# Patient Record
Sex: MALE | Race: BLACK OR AFRICAN AMERICAN
[De-identification: names, ages, dates, MRNs, and addresses within clinical notes are randomized per-mention and may not be internally consistent; named-entity substitution may affect disease eponyms.]

---

## 2024-05-07 ENCOUNTER — DASHBOARD ENCOUNTERS (OUTPATIENT)
Age: 54
End: 2024-05-07

## 2024-05-07 ENCOUNTER — LAB OUTSIDE AN ENCOUNTER (OUTPATIENT)
Dept: URBAN - METROPOLITAN AREA CLINIC 74 | Facility: CLINIC | Age: 54
End: 2024-05-07

## 2024-05-07 ENCOUNTER — OFFICE VISIT (OUTPATIENT)
Dept: URBAN - METROPOLITAN AREA CLINIC 74 | Facility: CLINIC | Age: 54
End: 2024-05-07
Payer: COMMERCIAL

## 2024-05-07 VITALS
OXYGEN SATURATION: 97 % | HEART RATE: 92 BPM | DIASTOLIC BLOOD PRESSURE: 90 MMHG | HEIGHT: 73 IN | BODY MASS INDEX: 33.53 KG/M2 | SYSTOLIC BLOOD PRESSURE: 154 MMHG | WEIGHT: 253 LBS | TEMPERATURE: 97.9 F

## 2024-05-07 DIAGNOSIS — R19.7 DIARRHEA: ICD-10-CM

## 2024-05-07 DIAGNOSIS — Z12.11 COLON CANCER SCREENING: ICD-10-CM

## 2024-05-07 DIAGNOSIS — K21.9 GERD: ICD-10-CM

## 2024-05-07 PROCEDURE — 99204 OFFICE O/P NEW MOD 45 MIN: CPT | Performed by: INTERNAL MEDICINE

## 2024-05-07 RX ORDER — FLUTICASONE PROPIONATE 50 UG/1
1 SPRAY IN EACH NOSTRIL SPRAY, METERED NASAL ONCE A DAY
Status: ACTIVE | COMMUNITY

## 2024-05-07 RX ORDER — MELOXICAM 15 MG/1
1 TABLET TABLET ORAL ONCE A DAY
Status: ACTIVE | COMMUNITY

## 2024-05-07 RX ORDER — PANTOPRAZOLE SODIUM 40 MG/1
1 TABLET TABLET, DELAYED RELEASE ORAL ONCE A DAY
Qty: 90 | Refills: 0 | OUTPATIENT
Start: 2024-05-07

## 2024-05-07 RX ORDER — POLYETHYLENE GLYCOL 3350, SODIUM SULFATE, SODIUM CHLORIDE, POTASSIUM CHLORIDE, ASCORBIC ACID, SODIUM ASCORBATE 140-9-5.2G
1000 ML KIT ORAL ONCE
Qty: 1 | Refills: 0 | OUTPATIENT
Start: 2024-05-07 | End: 2024-05-08

## 2024-05-07 RX ORDER — ESCITALOPRAM OXALATE 10 MG/1
1 TABLET TABLET ORAL ONCE A DAY
Status: ACTIVE | COMMUNITY

## 2024-05-07 RX ORDER — CLONIDINE HYDROCHLORIDE 0.1 MG/1
1 TABLET TABLET ORAL ONCE A DAY
Status: ACTIVE | COMMUNITY

## 2024-05-07 RX ORDER — ZOLPIDEM TARTRATE 6.25 MG/1
1 TABLET AT BEDTIME AS NEEDED TABLET, COATED ORAL ONCE A DAY
Status: ACTIVE | COMMUNITY

## 2024-05-10 ENCOUNTER — LAB OUTSIDE AN ENCOUNTER (OUTPATIENT)
Dept: URBAN - METROPOLITAN AREA CLINIC 40 | Facility: CLINIC | Age: 54
End: 2024-05-10

## 2024-05-14 LAB
ADENOVIRUS F 40/41: NOT DETECTED
CAMPYLOBACTER: NOT DETECTED
CLOSTRIDIUM DIFFICILE: NOT DETECTED
ENTAMOEBA HISTOLYTICA: NOT DETECTED
ENTEROAGGREGATIVE E.COLI: NOT DETECTED
ENTEROTOXIGENIC E.COLI: NOT DETECTED
ESCHERICHIA COLI O157: NOT DETECTED
FECAL FAT, QUALITATIVE: (no result)
GIARDIA LAMBLIA: NOT DETECTED
NOROVIRUS GI/GII: NOT DETECTED
PANCREATICELASTASE ELISA, STOOL: (no result)
ROTAVIRUS A: NOT DETECTED
SALMONELLA SPP.: NOT DETECTED
SHIGA-LIKE TOXIN PRODUCING E.COLI: NOT DETECTED
SHIGELLA SPP. / ENTEROINVASIVE E.COLI: NOT DETECTED
VIBRIO PARAHAEMOLYTICUS: NOT DETECTED
VIBRIO SPP.: NOT DETECTED
YERSINIA ENTEROCOLITICA: NOT DETECTED

## 2024-05-15 ENCOUNTER — TELEPHONE ENCOUNTER (OUTPATIENT)
Dept: URBAN - METROPOLITAN AREA CLINIC 74 | Facility: CLINIC | Age: 54
End: 2024-05-15

## 2024-05-15 RX ORDER — PANCRELIPASE 36000; 180000; 114000 [USP'U]/1; [USP'U]/1; [USP'U]/1
2 CAPSULES BEFORE MEALS AND 1 CAPSULE BEFORE SNACKS CAPSULE, DELAYED RELEASE PELLETS ORAL DAILY
Qty: 720 | Refills: 1 | OUTPATIENT
Start: 2024-05-15 | End: 2024-11-10

## 2024-06-24 ENCOUNTER — TELEPHONE ENCOUNTER (OUTPATIENT)
Dept: URBAN - METROPOLITAN AREA CLINIC 92 | Facility: CLINIC | Age: 54
End: 2024-06-24

## 2024-06-26 ENCOUNTER — OFFICE VISIT (OUTPATIENT)
Dept: URBAN - METROPOLITAN AREA SURGERY CENTER 30 | Facility: SURGERY CENTER | Age: 54
End: 2024-06-26

## 2024-06-26 ENCOUNTER — OUT OF OFFICE VISIT (OUTPATIENT)
Dept: URBAN - METROPOLITAN AREA SURGERY CENTER 30 | Facility: SURGERY CENTER | Age: 54
End: 2024-06-26
Payer: COMMERCIAL

## 2024-06-26 DIAGNOSIS — K57.30 ACQUIRED DIVERTICULOSIS OF COLON: ICD-10-CM

## 2024-06-26 DIAGNOSIS — K63.89 APPENDICITIS EPIPLOICA: ICD-10-CM

## 2024-06-26 DIAGNOSIS — Z12.11 COLON CANCER SCREENING: ICD-10-CM

## 2024-06-26 DIAGNOSIS — R19.7 ACUTE DIARRHEA: ICD-10-CM

## 2024-06-26 DIAGNOSIS — K31.89 ACHYLIA: ICD-10-CM

## 2024-06-26 DIAGNOSIS — R12 BURNING REFLUX: ICD-10-CM

## 2024-06-26 DIAGNOSIS — K22.89 DILATATION OF ESOPHAGUS: ICD-10-CM

## 2024-06-26 DIAGNOSIS — K29.70 CHRONIC ACITVE GASTRITIS (H.PYLORI NEGATIVE): ICD-10-CM

## 2024-06-26 DIAGNOSIS — K64.8 EXTERNAL HEMORRHOIDS: ICD-10-CM

## 2024-06-26 PROCEDURE — 00813 ANES UPR LWR GI NDSC PX: CPT | Performed by: NURSE ANESTHETIST, CERTIFIED REGISTERED

## 2024-06-26 RX ORDER — MELOXICAM 15 MG/1
1 TABLET TABLET ORAL ONCE A DAY
Status: ACTIVE | COMMUNITY

## 2024-06-26 RX ORDER — PANTOPRAZOLE SODIUM 40 MG/1
1 TABLET TABLET, DELAYED RELEASE ORAL ONCE A DAY
Qty: 90 | Refills: 0 | Status: ACTIVE | COMMUNITY
Start: 2024-05-07

## 2024-06-26 RX ORDER — ESCITALOPRAM OXALATE 10 MG/1
1 TABLET TABLET ORAL ONCE A DAY
Status: ACTIVE | COMMUNITY

## 2024-06-26 RX ORDER — ZOLPIDEM TARTRATE 6.25 MG/1
1 TABLET AT BEDTIME AS NEEDED TABLET, COATED ORAL ONCE A DAY
Status: ACTIVE | COMMUNITY

## 2024-06-26 RX ORDER — CLONIDINE HYDROCHLORIDE 0.1 MG/1
1 TABLET TABLET ORAL ONCE A DAY
Status: ACTIVE | COMMUNITY

## 2024-06-26 RX ORDER — PANCRELIPASE 36000; 180000; 114000 [USP'U]/1; [USP'U]/1; [USP'U]/1
2 CAPSULES BEFORE MEALS AND 1 CAPSULE BEFORE SNACKS CAPSULE, DELAYED RELEASE PELLETS ORAL DAILY
Qty: 720 | Refills: 1 | Status: ACTIVE | COMMUNITY
Start: 2024-05-15 | End: 2024-11-10

## 2024-06-26 RX ORDER — FLUTICASONE PROPIONATE 50 UG/1
1 SPRAY IN EACH NOSTRIL SPRAY, METERED NASAL ONCE A DAY
Status: ACTIVE | COMMUNITY

## 2024-07-25 PROBLEM — 196735001: Status: ACTIVE | Noted: 2024-07-25

## 2024-07-25 PROBLEM — 266433003: Status: ACTIVE | Noted: 2024-07-25

## 2024-07-25 PROBLEM — 397881000: Status: ACTIVE | Noted: 2024-07-25

## 2024-07-30 ENCOUNTER — OFFICE VISIT (OUTPATIENT)
Dept: URBAN - METROPOLITAN AREA CLINIC 74 | Facility: CLINIC | Age: 54
End: 2024-07-30

## 2024-07-30 RX ORDER — ESCITALOPRAM OXALATE 10 MG/1
1 TABLET TABLET ORAL ONCE A DAY
COMMUNITY

## 2024-07-30 RX ORDER — MELOXICAM 15 MG/1
1 TABLET TABLET ORAL ONCE A DAY
COMMUNITY

## 2024-07-30 RX ORDER — PANCRELIPASE 36000; 180000; 114000 [USP'U]/1; [USP'U]/1; [USP'U]/1
2 CAPSULES BEFORE MEALS AND 1 CAPSULE BEFORE SNACKS CAPSULE, DELAYED RELEASE PELLETS ORAL DAILY
Qty: 720 | Refills: 1 | COMMUNITY
Start: 2024-05-15 | End: 2024-11-10

## 2024-07-30 RX ORDER — ZOLPIDEM TARTRATE 6.25 MG/1
1 TABLET AT BEDTIME AS NEEDED TABLET, COATED ORAL ONCE A DAY
COMMUNITY

## 2024-07-30 RX ORDER — PANTOPRAZOLE SODIUM 40 MG/1
1 TABLET TABLET, DELAYED RELEASE ORAL ONCE A DAY
Qty: 90 | Refills: 0 | COMMUNITY
Start: 2024-05-07

## 2024-07-30 RX ORDER — CLONIDINE HYDROCHLORIDE 0.1 MG/1
1 TABLET TABLET ORAL ONCE A DAY
COMMUNITY

## 2024-07-30 RX ORDER — FLUTICASONE PROPIONATE 50 UG/1
1 SPRAY IN EACH NOSTRIL SPRAY, METERED NASAL ONCE A DAY
COMMUNITY

## 2024-07-30 NOTE — HPI-TODAY'S VISIT:
The patient is 54-year-old male with past medical history as noted below known to Dr. Benitez is presenting to our clinic todayd to discuss his recent EGD and colonoscopy results. The patient was started on Pantoprazole 40 mg once daily and Creon 36,000 2 tablet befor each meals and 1 tablet before snack.    Diagnostic studies: -- Stool study on 05/10/2024 GPP and WBC unremarkable.  Pancreatic elastase 118.94 consistent with mild to moderate pancreatic enzyme insufficiency.  Procedures: -- EGD with biopsy on 06/26/2024 by Dr. Campoverde noted Z-line variable, 40 cm from the incisors.  Gastritis.  No gross lesion entire examined duodenum.  Biopsy small bowel with no significant abnormality.  Stomach with foveolar hyperplasia.  No H.pylori organism or intestinal metaplasia.  Esophagus with reflux type changes.  No Gasca's esophagus and eosinophilic esophagitis.  -- Colonoscopy with biopsy on 06/26/2024 by Dr. Campoverde noted diverticulosis in sigmoid colon and the descending colon.  Internal hemorrhoids.  The examined portion of the ileum was normal.  Repeat colonoscopy in 10 years for surveillance.  Random biopsy of colon with unremarkable results.

## 2024-08-13 ENCOUNTER — LAB OUTSIDE AN ENCOUNTER (OUTPATIENT)
Dept: URBAN - METROPOLITAN AREA CLINIC 74 | Facility: CLINIC | Age: 54
End: 2024-08-13

## 2024-08-13 ENCOUNTER — OFFICE VISIT (OUTPATIENT)
Dept: URBAN - METROPOLITAN AREA CLINIC 74 | Facility: CLINIC | Age: 54
End: 2024-08-13
Payer: COMMERCIAL

## 2024-08-13 VITALS
DIASTOLIC BLOOD PRESSURE: 98 MMHG | SYSTOLIC BLOOD PRESSURE: 158 MMHG | HEART RATE: 92 BPM | HEIGHT: 72 IN | TEMPERATURE: 99.1 F | BODY MASS INDEX: 34.21 KG/M2 | WEIGHT: 252.6 LBS

## 2024-08-13 DIAGNOSIS — K21.00 CHRONIC REFLUX ESOPHAGITIS: ICD-10-CM

## 2024-08-13 DIAGNOSIS — K52.89 (LYMPHOCYTIC) MICROSCOPIC COLITIS: ICD-10-CM

## 2024-08-13 DIAGNOSIS — K29.30 CHRONIC SUPERFICIAL GASTRITIS WITHOUT BLEEDING: ICD-10-CM

## 2024-08-13 DIAGNOSIS — K86.81 EXOCRINE PANCREATIC INSUFFICIENCY: ICD-10-CM

## 2024-08-13 PROCEDURE — 99214 OFFICE O/P EST MOD 30 MIN: CPT | Performed by: PHYSICIAN ASSISTANT

## 2024-08-13 RX ORDER — PANTOPRAZOLE SODIUM 40 MG/1
1 TABLET TABLET, DELAYED RELEASE ORAL ONCE A DAY
Qty: 90 | Refills: 0 | COMMUNITY
Start: 2024-05-07

## 2024-08-13 RX ORDER — FLUTICASONE PROPIONATE 50 UG/1
1 SPRAY IN EACH NOSTRIL SPRAY, METERED NASAL ONCE A DAY
COMMUNITY

## 2024-08-13 RX ORDER — ESCITALOPRAM OXALATE 10 MG/1
1 TABLET TABLET ORAL ONCE A DAY
COMMUNITY

## 2024-08-13 RX ORDER — ZOLPIDEM TARTRATE 6.25 MG/1
1 TABLET AT BEDTIME AS NEEDED TABLET, COATED ORAL ONCE A DAY
COMMUNITY

## 2024-08-13 RX ORDER — MELOXICAM 15 MG/1
1 TABLET TABLET ORAL ONCE A DAY
COMMUNITY

## 2024-08-13 RX ORDER — PANCRELIPASE 36000; 180000; 114000 [USP'U]/1; [USP'U]/1; [USP'U]/1
2 CAPSULES BEFORE MEALS AND 1 CAPSULE BEFORE SNACKS CAPSULE, DELAYED RELEASE PELLETS ORAL
Refills: 1
Start: 2024-05-15 | End: 2025-02-08

## 2024-08-13 RX ORDER — PANTOPRAZOLE SODIUM 40 MG/1
1 TABLET TABLET, DELAYED RELEASE ORAL ONCE A DAY
Qty: 90 | Refills: 1
Start: 2024-05-07

## 2024-08-13 RX ORDER — CLONIDINE HYDROCHLORIDE 0.1 MG/1
1 TABLET TABLET ORAL ONCE A DAY
COMMUNITY

## 2024-08-13 RX ORDER — PANCRELIPASE 36000; 180000; 114000 [USP'U]/1; [USP'U]/1; [USP'U]/1
2 CAPSULES BEFORE MEALS AND 1 CAPSULE BEFORE SNACKS CAPSULE, DELAYED RELEASE PELLETS ORAL DAILY
Qty: 720 | Refills: 1 | COMMUNITY
Start: 2024-05-15 | End: 2024-11-10

## 2024-08-13 NOTE — HPI-TODAY'S VISIT:
The patient is 54-year-old male with past medical history as noted below known to Dr. Benitez is presenting to our clinic todayd to discuss his recent EGD and colonoscopy results. The patient was started on Pantoprazole 40 mg once daily and Creon 36,000 2 t ablet befor each meals and 1 tablet before snack. He is doing much better on current medical management.He denies any new GI issues today.    Diagnostic studies: -- Stool study on 05/10/2024 GPP and WBC unremarkable.  Pancreatic elastase 118.94 consistent with mild to moderate pancreatic enzyme insufficiency.  Procedures: -- EGD with biopsy on 06/26/2024 by Dr. Campoverde noted Z-line variable, 40 cm from the incisors.  Gastritis.  No gross lesion entire examined duodenum.  Biopsy small bowel with no significant abnormality.  Stomach with foveolar hyperplasia.  No H.pylori organism or intestinal metaplasia.  Esophagus with reflux type changes.  No Gasca's esophagus and eosinophilic esophagitis.  -- Colonoscopy with biopsy on 06/26/2024 by Dr. Campoverde noted diverticulosis in sigmoid colon and the descending colon.  Internal hemorrhoids.  The examined portion of the ileum was normal.  Repeat colonoscopy in 10 years for surveillance.  Random biopsy of colon with unremarkable results.

## 2024-08-14 LAB
ABSOLUTE BASOPHILS: 42
ABSOLUTE EOSINOPHILS: 191
ABSOLUTE LYMPHOCYTES: 2120
ABSOLUTE MONOCYTES: 625
ABSOLUTE NEUTROPHILS: 2321
ALBUMIN/GLOBULIN RATIO: 1.7
ALBUMIN: 4.5
ALKALINE PHOSPHATASE: 56
ALT: 25
AMYLASE: 91
AST: 16
BASOPHILS: 0.8
BILIRUBIN, TOTAL: 0.8
BUN/CREATININE RATIO: (no result)
CALCIUM: 10.1
CARBON DIOXIDE: 27
CHLORIDE: 103
CREATININE: 1.06
EGFR: 84
EOSINOPHILS: 3.6
FIB 4 INDEX: 0.62
FIB 4 INTERPRETATION: (no result)
GGT: 44
GLOBULIN: 2.7
GLUCOSE: 81
HEMATOCRIT: 51.1
HEMOGLOBIN: 16.8
LIPASE: 24
LYMPHOCYTES: 40
MCH: 31.5
MCHC: 32.9
MCV: 95.7
MONOCYTES: 11.8
MPV: 9.5
NEUTROPHILS: 43.8
PLATELET COUNT: 269
PLATELET COUNT: 269
POTASSIUM: 4.3
PROTEIN, TOTAL: 7.2
RDW: 13.5
RED BLOOD CELL COUNT: 5.34
SODIUM: 143
UREA NITROGEN (BUN): 12
WHITE BLOOD CELL COUNT: 5.3

## 2024-09-09 ENCOUNTER — OFFICE VISIT (OUTPATIENT)
Dept: URBAN - METROPOLITAN AREA CLINIC 74 | Facility: CLINIC | Age: 54
End: 2024-09-09
Payer: COMMERCIAL

## 2024-09-09 VITALS
HEART RATE: 78 BPM | SYSTOLIC BLOOD PRESSURE: 148 MMHG | DIASTOLIC BLOOD PRESSURE: 86 MMHG | BODY MASS INDEX: 34.81 KG/M2 | OXYGEN SATURATION: 98 % | TEMPERATURE: 96.9 F | HEIGHT: 72 IN | WEIGHT: 257 LBS

## 2024-09-09 DIAGNOSIS — K21.00 CHRONIC REFLUX ESOPHAGITIS: ICD-10-CM

## 2024-09-09 DIAGNOSIS — K86.81 EXOCRINE PANCREATIC INSUFFICIENCY: ICD-10-CM

## 2024-09-09 DIAGNOSIS — K29.30 CHRONIC SUPERFICIAL GASTRITIS WITHOUT BLEEDING: ICD-10-CM

## 2024-09-09 PROCEDURE — 99213 OFFICE O/P EST LOW 20 MIN: CPT | Performed by: PHYSICIAN ASSISTANT

## 2024-09-09 RX ORDER — PANTOPRAZOLE SODIUM 40 MG/1
1 TABLET TABLET, DELAYED RELEASE ORAL ONCE A DAY
Qty: 90 | Refills: 1 | Status: ACTIVE | COMMUNITY
Start: 2024-05-07

## 2024-09-09 RX ORDER — ESCITALOPRAM OXALATE 10 MG/1
1 TABLET TABLET ORAL ONCE A DAY
Status: ACTIVE | COMMUNITY

## 2024-09-09 RX ORDER — MELOXICAM 15 MG/1
1 TABLET TABLET ORAL ONCE A DAY
Status: ACTIVE | COMMUNITY

## 2024-09-09 RX ORDER — PANTOPRAZOLE SODIUM 40 MG/1
1 TABLET TABLET, DELAYED RELEASE ORAL ONCE A DAY
Qty: 90 | Refills: 1
Start: 2024-05-07

## 2024-09-09 RX ORDER — PANCRELIPASE 36000; 180000; 114000 [USP'U]/1; [USP'U]/1; [USP'U]/1
2 CAPSULES BEFORE MEALS AND 1 CAPSULE BEFORE SNACKS CAPSULE, DELAYED RELEASE PELLETS ORAL
Qty: 900 | Refills: 1
Start: 2024-05-15 | End: 2025-03-08

## 2024-09-09 RX ORDER — PANCRELIPASE 36000; 180000; 114000 [USP'U]/1; [USP'U]/1; [USP'U]/1
2 CAPSULES BEFORE MEALS AND 1 CAPSULE BEFORE SNACKS CAPSULE, DELAYED RELEASE PELLETS ORAL
Refills: 1 | Status: ACTIVE | COMMUNITY
Start: 2024-05-15 | End: 2025-02-08

## 2024-09-09 RX ORDER — FLUTICASONE PROPIONATE 50 UG/1
1 SPRAY IN EACH NOSTRIL SPRAY, METERED NASAL ONCE A DAY
Status: ACTIVE | COMMUNITY

## 2024-09-09 RX ORDER — CLONIDINE HYDROCHLORIDE 0.1 MG/1
1 TABLET TABLET ORAL ONCE A DAY
Status: ACTIVE | COMMUNITY

## 2024-09-09 RX ORDER — ZOLPIDEM TARTRATE 6.25 MG/1
1 TABLET AT BEDTIME AS NEEDED TABLET, COATED ORAL ONCE A DAY
Status: ACTIVE | COMMUNITY

## 2024-09-09 NOTE — HPI-TODAY'S VISIT:
The patient is 54-year-old male with past medical history as noted below known to Dr. Benitez is presenting to our clinic todayd to discuss his recent labs and imaging results. The patient continues on Pantoprazole 40 mg once daily and Creon 36,000 2 tablets befor each meals and 1 tablet before snack. He is doing well and no GI issues today.   Diagnostic studies: -- CT scan of abdomen and pelvis on 08/30/3034 with normal CT of the abdomen and pelvis with no acute abnormality identified. Liver, gallbladder, pancreas, spleen, adrenal glands, and kidneys are unremarkable.  -- Labs on 08/13/2024 CBC, CMP, Amylase, Lipase, and FIB-4 INDEX .62 are normal.   -- Stool study on 05/10/2024 GPP and WBC unremarkable.  Pancreatic elastase 118.94 consistent with mild to moderate pancreatic enzyme insufficiency.  Procedures: -- EGD with biopsy on 06/26/2024 by Dr. Campoverde noted Z-line variable, 40 cm from the incisors.  Gastritis.  No gross lesion entire examined duodenum.  Biopsy small bowel with no significant abnormality.  Stomach with foveolar hyperplasia.  No H.pylori organism or intestinal metaplasia.  Esophagus with reflux type changes.  No Gasca's esophagus and eosinophilic esophagitis.  -- Colonoscopy with biopsy on 06/26/2024 by Dr. Campoverde noted diverticulosis in sigmoid colon and the descending colon.  Internal hemorrhoids.  The examined portion of the ileum was normal.  Repeat colonoscopy in 10 years for surveillance.  Random biopsy of colon with unremarkable results.

## 2025-03-10 ENCOUNTER — OFFICE VISIT (OUTPATIENT)
Dept: URBAN - METROPOLITAN AREA CLINIC 74 | Facility: CLINIC | Age: 55
End: 2025-03-10

## 2025-03-10 RX ORDER — FLUTICASONE PROPIONATE 50 UG/1
1 SPRAY IN EACH NOSTRIL SPRAY, METERED NASAL ONCE A DAY
Status: ACTIVE | COMMUNITY

## 2025-03-10 RX ORDER — CLONIDINE HYDROCHLORIDE 0.1 MG/1
1 TABLET TABLET ORAL ONCE A DAY
Status: ACTIVE | COMMUNITY

## 2025-03-10 RX ORDER — PANTOPRAZOLE SODIUM 40 MG/1
1 TABLET TABLET, DELAYED RELEASE ORAL ONCE A DAY
Qty: 90 | Refills: 1 | Status: ACTIVE | COMMUNITY
Start: 2024-05-07

## 2025-03-10 RX ORDER — ESCITALOPRAM OXALATE 10 MG/1
1 TABLET TABLET ORAL ONCE A DAY
Status: ACTIVE | COMMUNITY

## 2025-03-10 RX ORDER — MELOXICAM 15 MG/1
1 TABLET TABLET ORAL ONCE A DAY
Status: ACTIVE | COMMUNITY

## 2025-03-10 RX ORDER — ZOLPIDEM TARTRATE 6.25 MG/1
1 TABLET AT BEDTIME AS NEEDED TABLET, COATED ORAL ONCE A DAY
Status: ACTIVE | COMMUNITY

## 2025-03-10 NOTE — HPI-TODAY'S VISIT:
The patient is 54-year-old male with past medical history as noted below known to Dr. Campoverde is presenting to our clinic today for follow up appointment. The patient continues on Pantoprazole 40 mg once daily and Creon 36,000 2 tablets befor each meals and 1 tablet before snack.   Diagnostic studies: -- CT scan of abdomen and pelvis on 08/30/2024 with normal CT of the abdomen and pelvis with no acute abnormality identified. Liver, gallbladder, pancreas, spleen, adrenal glands, and kidneys are unremarkable.  -- Labs on 08/13/2024 CBC, CMP, Amylase, Lipase, and FIB-4 INDEX .62 are normal.   -- Stool study on 05/10/2024 GPP and WBC unremarkable.  Pancreatic elastase 118.94 consistent with mild to moderate pancreatic enzyme insufficiency.  Procedures: -- EGD with biopsy on 06/26/2024 by Dr. Campoverde noted Z-line variable, 40 cm from the incisors.  Gastritis.  No gross lesion entire examined duodenum.  Biopsy small bowel with no significant abnormality.  Stomach with foveolar hyperplasia.  No H.pylori organism or intestinal metaplasia.  Esophagus with reflux type changes.  No Gasca's esophagus and eosinophilic esophagitis.  -- Colonoscopy with biopsy on 06/26/2024 by Dr. Campoverde noted diverticulosis in sigmoid colon and the descending colon.  Internal hemorrhoids.  The examined portion of the ileum was normal.  Repeat colonoscopy in 10 years for surveillance.  Random biopsy of colon with unremarkable results.

## 2025-03-26 NOTE — PHYSICAL EXAM CARDIOVASCULAR:
no edema, no murmurs, regular rate and rhythm Counseling: I discussed the itch-scratch cycle. I explained that scratching will lead to more itching and this cycle becomes self perpetuating. I discussed methods to prevent scratching in an effort to stop the patient's itching. Detail Level: Simple